# Patient Record
Sex: FEMALE
[De-identification: names, ages, dates, MRNs, and addresses within clinical notes are randomized per-mention and may not be internally consistent; named-entity substitution may affect disease eponyms.]

---

## 2018-10-06 ENCOUNTER — HOSPITAL (OUTPATIENT)
Dept: OTHER | Age: 63
End: 2018-10-06

## 2018-10-12 ENCOUNTER — HOSPITAL (OUTPATIENT)
Dept: OTHER | Age: 63
End: 2018-10-12

## 2018-10-19 ENCOUNTER — HOSPITAL (OUTPATIENT)
Dept: OTHER | Age: 63
End: 2018-10-19

## 2018-11-06 PROBLEM — Z83.719 FAMILY HISTORY OF COLONIC POLYPS: Status: ACTIVE | Noted: 2018-11-06

## 2018-11-06 PROBLEM — Z80.0 FAMILY HISTORY OF COLON CANCER: Status: ACTIVE | Noted: 2018-11-06

## 2018-11-06 PROBLEM — Z86.0100 HISTORY OF COLON POLYPS: Status: ACTIVE | Noted: 2018-11-06

## 2020-08-07 PROBLEM — J30.2 SEASONAL ALLERGIES: Status: ACTIVE | Noted: 2020-08-07

## 2020-08-07 PROBLEM — I10 ESSENTIAL HYPERTENSION: Status: ACTIVE | Noted: 2020-08-07

## 2024-11-03 ENCOUNTER — HOSPITAL ENCOUNTER (EMERGENCY)
Facility: HOSPITAL | Age: 69
Discharge: HOME | End: 2024-11-03
Attending: EMERGENCY MEDICINE
Payer: COMMERCIAL

## 2024-11-03 VITALS
RESPIRATION RATE: 18 BRPM | DIASTOLIC BLOOD PRESSURE: 86 MMHG | TEMPERATURE: 98 F | SYSTOLIC BLOOD PRESSURE: 162 MMHG | HEART RATE: 63 BPM | OXYGEN SATURATION: 96 %

## 2024-11-03 DIAGNOSIS — B02.30 HERPES ZOSTER WITH OPHTHALMIC COMPLICATION, UNSPECIFIED HERPES ZOSTER EYE DISEASE: Primary | ICD-10-CM

## 2024-11-03 PROCEDURE — 99284 EMERGENCY DEPT VISIT MOD MDM: CPT

## 2024-11-03 PROCEDURE — 99283 EMERGENCY DEPT VISIT LOW MDM: CPT

## 2024-11-03 RX ORDER — LEVOTHYROXINE SODIUM 25 UG/1
25 TABLET ORAL
COMMUNITY

## 2024-11-03 RX ORDER — ROSUVASTATIN CALCIUM 5 MG/1
5 TABLET, COATED ORAL NIGHTLY
COMMUNITY

## 2024-11-03 RX ORDER — TETRACAINE HYDROCHLORIDE 5 MG/ML
1 SOLUTION OPHTHALMIC ONCE
Status: COMPLETED | OUTPATIENT
Start: 2024-11-03 | End: 2024-11-03

## 2024-11-03 NOTE — DISCHARGE INSTRUCTIONS
Please go to Dr. Macdonald's office tomorrow morning in [place] at 9:00am    Continue your valacyclovir    Return back to ER immediately if worsening pain, vision changes, any other concerns

## 2024-11-03 NOTE — ED INITIAL ASSESSMENT (HPI)
Patient arrives ambulatory through triage c/o shingles spreading to the right eye. Reports it originally started Monday. Diagnosed on Thursday via TeleDoc.

## 2024-11-03 NOTE — ED NOTES
Received pt a/ox4, clear speech, nad, no resp distress, ambulatory with steady gait  Here with c/o shingles rash onset Monday to R side of face. Dx with shingles on Friday, started 10 day course antiviral meds    Rash now moving to R eye, denies changes in vision. Reports tearing and crusting +pain    Stable

## 2024-11-03 NOTE — ED NOTES
Pt verbalized understanding of discharge and follow up instructions, along with prescriptions. Denies additional questioning  Stable upon discharge

## 2024-11-03 NOTE — ED PROVIDER NOTES
Patient Seen in: [facility] Emergency Department      History   No chief complaint on file.    Stated Complaint: Shingles    Subjective:   HPI      68 year old female presents with pain to right eye.  Noticed a rash on her face 6 days ago, started taking valacyclovir 2 days ago, but also had some pain in her right eye that is worsening, feels like burning.  No  vision changes, facial weakness    Objective:     Past Medical History:    Essential hypertension    Hyperlipidemia    Thyroid disease              Past Surgical History:   Procedure Laterality Date    Colonoscopy  01/04/2019    colon polyp- sessile serrated adenoma, no dysplasia    Hysterectomy                  Social History     Socioeconomic History    Marital status:    Tobacco Use    Smoking status: Never    Smokeless tobacco: Never   Vaping Use    Vaping status: Never Used   Substance and Sexual Activity    Alcohol use: No     Comment: rare use    Drug use: No                  Physical Exam     ED Triage Vitals [11/03/24 1134]   BP (!) 180/86   Pulse 65   Resp 20   Temp 97.5 °F (36.4 °C)   Temp src Oral   SpO2 96 %   O2 Device None (Room air)       Current Vitals:   Vital Signs  BP: (!) 180/86  Pulse: 65  Resp: 20  Temp: 97.5 °F (36.4 °C)  Temp src: Oral    Oxygen Therapy  SpO2: 96 %  O2 Device: None (Room air)        Physical Exam  Vital signs reviewed. Nursing note reviewed.  Constitutional: Well-developed. Well-nourished. In no acute distress  HENT: Mucous membranes moist. Vesicular rash over right V1 distribution including tip of nose  EYES: No scleral icterus or conjunctival injection. PERRL. EOMI. Under fluorescein stain, no dendritic lesions seen. Normal visual acuity  NECK: Full ROM. Supple.   CARDIAC: Normal rate.   PULM/CHEST: Non labored breathing  ABD: Non distended  RECTAL: deferred  Extremities: No deformities  NEURO: Awake, alert, following commands, moving extremities, answering questions. No facial weakness  SKIN: Warm  and dry. No rash or lesions.  PSYCH: Normal judgment. Normal affect.        ED Course   Labs Reviewed - No data to display                MDM      Assessment:Patient is a 68 year old female presenting to the ED due to right eye pain, shingles.    Comorbidities/chronic illnesses impacting care: none    History obtained from: patient    Laboratory results above were independently viewed and interpreted by me.  External records and previous hospitalization records reviewed and documented below      Radiography/Imaging:    No orders to display     Imaging result above were independently viewed and interpreted by me.    Consideration of Social Determinants of Health and Impact on Medical Decision Making:  Housing/Transportation/Financial Strain/Access to healthcare/Food insecurity/family or Community support/Language and Literacy/Substance abuse/Mental health concerns/Disabilities     -none    ED course  Patient arrives here hypertensive.  Comfortable.  Has vesicular rash in right V1 distribution, including tip of nose.  Complaining of right eye pain for 2 days now, has been on oral valacyclovir during this time.  First symptom was 6 days ago. Will stain eye, discuss with ophthalmology concerning for zoster ophthalmicus.    Progress note: Under fluorescein stain, no obvious dendritic lesions.    Progress note: Discussed with ophthalmology, with no conjunctival injection, no obvious lesions on exam, normal visual acuity, feel outpatient follow-up is appropriate.  Patient will see ophthalmology tomorrow morning at 9 AM.  This was discussed with her as well as continued valacyclovir, as well as return precautions here.            Medications   tetracaine (Pontocaine) 0.5 % ophthalmic solution 1 drop (1 drop Right Eye Given 11/3/24 1400)   fluorescein sodium (Ful-Marisol) 1 MG ophthalmic strip 1 strip (1 strip Right Eye Given 11/3/24 1400)                 Medical Decision Making      Disposition and Plan     Clinical  Impression:  1. Herpes zoster with ophthalmic complication, unspecified herpes zoster eye disease         Disposition:  Discharge  11/3/2024  3:40 pm    Follow-up:  Sydnee Macdonald MD  [address]  [place] [address]  [phone]    Go to  9:00 appointment    We recommend that you schedule follow up care with a primary care provider within the next three months to obtain basic health screening including reassessment of your blood pressure.      Medications Prescribed:  Current Discharge Medication List              Supplementary Documentation: